# Patient Record
Sex: MALE
[De-identification: names, ages, dates, MRNs, and addresses within clinical notes are randomized per-mention and may not be internally consistent; named-entity substitution may affect disease eponyms.]

---

## 2018-05-12 ENCOUNTER — HOSPITAL ENCOUNTER (INPATIENT)
Dept: HOSPITAL 92 - ERS | Age: 63
LOS: 4 days | Discharge: HOME | DRG: 291 | End: 2018-05-16
Attending: FAMILY MEDICINE | Admitting: FAMILY MEDICINE
Payer: SELF-PAY

## 2018-05-12 ENCOUNTER — HOSPITAL ENCOUNTER (EMERGENCY)
Dept: HOSPITAL 18 - NAV ERS | Age: 63
Discharge: HOME | End: 2018-05-12
Payer: SELF-PAY

## 2018-05-12 VITALS — BODY MASS INDEX: 29 KG/M2

## 2018-05-12 DIAGNOSIS — N17.9: ICD-10-CM

## 2018-05-12 DIAGNOSIS — D63.1: ICD-10-CM

## 2018-05-12 DIAGNOSIS — I50.9: ICD-10-CM

## 2018-05-12 DIAGNOSIS — N18.9: ICD-10-CM

## 2018-05-12 DIAGNOSIS — I08.1: ICD-10-CM

## 2018-05-12 DIAGNOSIS — N18.6: ICD-10-CM

## 2018-05-12 DIAGNOSIS — Z79.899: ICD-10-CM

## 2018-05-12 DIAGNOSIS — E87.5: ICD-10-CM

## 2018-05-12 DIAGNOSIS — K74.60: ICD-10-CM

## 2018-05-12 DIAGNOSIS — E87.70: ICD-10-CM

## 2018-05-12 DIAGNOSIS — E03.9: ICD-10-CM

## 2018-05-12 DIAGNOSIS — R74.8: ICD-10-CM

## 2018-05-12 DIAGNOSIS — K70.30: ICD-10-CM

## 2018-05-12 DIAGNOSIS — E87.70: Primary | ICD-10-CM

## 2018-05-12 DIAGNOSIS — E87.2: ICD-10-CM

## 2018-05-12 DIAGNOSIS — F10.21: ICD-10-CM

## 2018-05-12 DIAGNOSIS — Z99.2: ICD-10-CM

## 2018-05-12 DIAGNOSIS — E11.22: ICD-10-CM

## 2018-05-12 DIAGNOSIS — Z79.4: ICD-10-CM

## 2018-05-12 DIAGNOSIS — I13.2: Primary | ICD-10-CM

## 2018-05-12 DIAGNOSIS — I13.0: ICD-10-CM

## 2018-05-12 DIAGNOSIS — E11.9: ICD-10-CM

## 2018-05-12 DIAGNOSIS — R79.89: ICD-10-CM

## 2018-05-12 LAB
ALBUMIN SERPL BCG-MCNC: 3.4 G/DL (ref 3.4–4.8)
ALBUMIN SERPL BCG-MCNC: 3.5 G/DL (ref 3.4–4.8)
ALP SERPL-CCNC: 190 U/L (ref 40–150)
ALP SERPL-CCNC: 204 U/L (ref 40–150)
ALT SERPL W P-5'-P-CCNC: 43 U/L (ref 8–55)
ALT SERPL W P-5'-P-CCNC: 50 U/L (ref 8–55)
ANION GAP SERPL CALC-SCNC: 17 MMOL/L (ref 10–20)
ANION GAP SERPL CALC-SCNC: 18 MMOL/L (ref 10–20)
ANION GAP SERPL CALC-SCNC: 22 MMOL/L (ref 10–20)
APTT PPP: 35.2 SEC (ref 22.9–36.1)
AST SERPL-CCNC: 51 U/L (ref 5–34)
AST SERPL-CCNC: 62 U/L (ref 5–34)
BASOPHILS # BLD AUTO: 0 THOU/UL (ref 0–0.2)
BASOPHILS # BLD AUTO: 0.1 THOU/UL (ref 0–0.2)
BASOPHILS NFR BLD AUTO: 0.2 % (ref 0–1)
BASOPHILS NFR BLD AUTO: 0.8 % (ref 0–1)
BILIRUB SERPL-MCNC: 1.1 MG/DL (ref 0.2–1.2)
BILIRUB SERPL-MCNC: 1.1 MG/DL (ref 0.2–1.2)
BUN SERPL-MCNC: 114 MG/DL (ref 8.4–25.7)
BUN SERPL-MCNC: 115 MG/DL (ref 8.4–25.7)
BUN SERPL-MCNC: 76 MG/DL (ref 8.4–25.7)
CALCIUM SERPL-MCNC: 9 MG/DL (ref 7.8–10.44)
CALCIUM SERPL-MCNC: 9 MG/DL (ref 7.8–10.44)
CALCIUM SERPL-MCNC: 9.4 MG/DL (ref 7.8–10.44)
CHLORIDE SERPL-SCNC: 103 MMOL/L (ref 98–107)
CHLORIDE SERPL-SCNC: 107 MMOL/L (ref 98–107)
CHLORIDE SERPL-SCNC: 107 MMOL/L (ref 98–107)
CK MB SERPL-MCNC: 3.7 NG/ML (ref 0–6.6)
CK MB SERPL-MCNC: 4.6 NG/ML (ref 0–6.6)
CK SERPL-CCNC: 112 U/L (ref 30–200)
CK SERPL-CCNC: 121 U/L (ref 30–200)
CO2 SERPL-SCNC: 16 MMOL/L (ref 23–31)
CO2 SERPL-SCNC: 17 MMOL/L (ref 23–31)
CO2 SERPL-SCNC: 22 MMOL/L (ref 23–31)
CREAT CL PREDICTED SERPL C-G-VRATE: 0 ML/MIN (ref 70–130)
CREAT CL PREDICTED SERPL C-G-VRATE: 0 ML/MIN (ref 70–130)
CREAT CL PREDICTED SERPL C-G-VRATE: 19 ML/MIN (ref 70–130)
EOSINOPHIL # BLD AUTO: 0 THOU/UL (ref 0–0.7)
EOSINOPHIL # BLD AUTO: 0.1 THOU/UL (ref 0–0.7)
EOSINOPHIL NFR BLD AUTO: 0.2 % (ref 0–10)
EOSINOPHIL NFR BLD AUTO: 0.9 % (ref 0–10)
GLOBULIN SER CALC-MCNC: 3.7 G/DL (ref 2.4–3.5)
GLOBULIN SER CALC-MCNC: 3.7 G/DL (ref 2.4–3.5)
GLUCOSE SERPL-MCNC: 150 MG/DL (ref 80–115)
GLUCOSE SERPL-MCNC: 283 MG/DL (ref 80–115)
GLUCOSE SERPL-MCNC: 287 MG/DL (ref 80–115)
HBSAG INDEX: 0.21 S/CO (ref 0–0.99)
HGB BLD-MCNC: 10.3 G/DL (ref 14–18)
HGB BLD-MCNC: 10.5 G/DL (ref 14–18)
INR PPP: 1.1
IRON SERPL-MCNC: 18 UG/DL (ref 65–175)
LIPASE SERPL-CCNC: 62 U/L (ref 8–78)
LYMPHOCYTES # BLD AUTO: 1.4 THOU/UL (ref 1.2–3.4)
LYMPHOCYTES # BLD: 0.8 THOU/UL (ref 1.2–3.4)
LYMPHOCYTES NFR BLD AUTO: 14.5 % (ref 21–51)
LYMPHOCYTES NFR BLD AUTO: 6.8 % (ref 21–51)
MAGNESIUM SERPL-MCNC: 2.5 MG/DL (ref 1.6–2.6)
MCH RBC QN AUTO: 30.5 PG (ref 27–31)
MCH RBC QN AUTO: 32.2 PG (ref 27–31)
MCV RBC AUTO: 94.9 FL (ref 80–94)
MCV RBC AUTO: 97 FL (ref 80–94)
MONOCYTES # BLD AUTO: 0.7 THOU/UL (ref 0.11–0.59)
MONOCYTES # BLD AUTO: 0.8 THOU/UL (ref 0.11–0.59)
MONOCYTES NFR BLD AUTO: 5.9 % (ref 0–10)
MONOCYTES NFR BLD AUTO: 7.9 % (ref 0–10)
NEUTROPHILS # BLD AUTO: 7.5 THOU/UL (ref 1.4–6.5)
NEUTROPHILS # BLD AUTO: 9.8 THOU/UL (ref 1.4–6.5)
NEUTROPHILS NFR BLD AUTO: 75.9 % (ref 42–75)
NEUTROPHILS NFR BLD AUTO: 86.9 % (ref 42–75)
PLATELET # BLD AUTO: 216 THOU/UL (ref 130–400)
PLATELET # BLD AUTO: 218 THOU/UL (ref 130–400)
POTASSIUM SERPL-SCNC: 4.2 MMOL/L (ref 3.5–5.1)
POTASSIUM SERPL-SCNC: 5.7 MMOL/L (ref 3.5–5.1)
POTASSIUM SERPL-SCNC: 5.9 MMOL/L (ref 3.5–5.1)
PROT UR STRIP.AUTO-MCNC: (no result) MG/DL
PROTHROMBIN TIME: 14.8 SEC (ref 12–14.7)
RBC # BLD AUTO: 3.26 MILL/UL (ref 4.7–6.1)
RBC # BLD AUTO: 3.39 MILL/UL (ref 4.7–6.1)
SODIUM SERPL-SCNC: 136 MMOL/L (ref 136–145)
SODIUM SERPL-SCNC: 138 MMOL/L (ref 136–145)
SODIUM SERPL-SCNC: 139 MMOL/L (ref 136–145)
SP GR UR STRIP: 1.02 (ref 1–1.03)
TROPONIN I SERPL DL<=0.01 NG/ML-MCNC: 0.19 NG/ML (ref ?–0.03)
TROPONIN I SERPL DL<=0.01 NG/ML-MCNC: 0.21 NG/ML (ref ?–0.03)
TROPONIN I SERPL DL<=0.01 NG/ML-MCNC: 0.24 NG/ML (ref ?–0.03)
TROPONIN I SERPL DL<=0.01 NG/ML-MCNC: 0.25 NG/ML (ref ?–0.03)
TROPONIN I SERPL DL<=0.01 NG/ML-MCNC: 0.26 NG/ML (ref ?–0.03)
TROPONIN I SERPL DL<=0.01 NG/ML-MCNC: 0.26 NG/ML (ref ?–0.03)
UIBC SERPL-MCNC: 183 MCG/DL (ref 261–462)
VIT B12 SERPL-MCNC: 1265 PG/ML (ref 211–911)
WBC # BLD AUTO: 11.3 THOU/UL (ref 4.8–10.8)
WBC # BLD AUTO: 9.8 THOU/UL (ref 4.8–10.8)

## 2018-05-12 PROCEDURE — 82553 CREATINE MB FRACTION: CPT

## 2018-05-12 PROCEDURE — 87324 CLOSTRIDIUM AG IA: CPT

## 2018-05-12 PROCEDURE — 87449 NOS EACH ORGANISM AG IA: CPT

## 2018-05-12 PROCEDURE — 81015 MICROSCOPIC EXAM OF URINE: CPT

## 2018-05-12 PROCEDURE — 83036 HEMOGLOBIN GLYCOSYLATED A1C: CPT

## 2018-05-12 PROCEDURE — 82746 ASSAY OF FOLIC ACID SERUM: CPT

## 2018-05-12 PROCEDURE — 82550 ASSAY OF CK (CPK): CPT

## 2018-05-12 PROCEDURE — G0009 ADMIN PNEUMOCOCCAL VACCINE: HCPCS

## 2018-05-12 PROCEDURE — 93306 TTE W/DOPPLER COMPLETE: CPT

## 2018-05-12 PROCEDURE — 81003 URINALYSIS AUTO W/O SCOPE: CPT

## 2018-05-12 PROCEDURE — 83630 LACTOFERRIN FECAL (QUAL): CPT

## 2018-05-12 PROCEDURE — 93005 ELECTROCARDIOGRAM TRACING: CPT

## 2018-05-12 PROCEDURE — 83735 ASSAY OF MAGNESIUM: CPT

## 2018-05-12 PROCEDURE — 85610 PROTHROMBIN TIME: CPT

## 2018-05-12 PROCEDURE — 83605 ASSAY OF LACTIC ACID: CPT

## 2018-05-12 PROCEDURE — 83690 ASSAY OF LIPASE: CPT

## 2018-05-12 PROCEDURE — 80048 BASIC METABOLIC PNL TOTAL CA: CPT

## 2018-05-12 PROCEDURE — 36415 COLL VENOUS BLD VENIPUNCTURE: CPT

## 2018-05-12 PROCEDURE — 90935 HEMODIALYSIS ONE EVALUATION: CPT

## 2018-05-12 PROCEDURE — 82140 ASSAY OF AMMONIA: CPT

## 2018-05-12 PROCEDURE — 80061 LIPID PANEL: CPT

## 2018-05-12 PROCEDURE — 84484 ASSAY OF TROPONIN QUANT: CPT

## 2018-05-12 PROCEDURE — 96375 TX/PRO/DX INJ NEW DRUG ADDON: CPT

## 2018-05-12 PROCEDURE — 85025 COMPLETE CBC W/AUTO DIFF WBC: CPT

## 2018-05-12 PROCEDURE — 84443 ASSAY THYROID STIM HORMONE: CPT

## 2018-05-12 PROCEDURE — 82607 VITAMIN B-12: CPT

## 2018-05-12 PROCEDURE — 80053 COMPREHEN METABOLIC PANEL: CPT

## 2018-05-12 PROCEDURE — 71046 X-RAY EXAM CHEST 2 VIEWS: CPT

## 2018-05-12 PROCEDURE — 82728 ASSAY OF FERRITIN: CPT

## 2018-05-12 PROCEDURE — 84145 PROCALCITONIN (PCT): CPT

## 2018-05-12 PROCEDURE — 87340 HEPATITIS B SURFACE AG IA: CPT

## 2018-05-12 PROCEDURE — 83550 IRON BINDING TEST: CPT

## 2018-05-12 PROCEDURE — G0257 UNSCHED DIALYSIS ESRD PT HOS: HCPCS

## 2018-05-12 PROCEDURE — 83880 ASSAY OF NATRIURETIC PEPTIDE: CPT

## 2018-05-12 PROCEDURE — 90471 IMMUNIZATION ADMIN: CPT

## 2018-05-12 PROCEDURE — 84100 ASSAY OF PHOSPHORUS: CPT

## 2018-05-12 PROCEDURE — 90732 PPSV23 VACC 2 YRS+ SUBQ/IM: CPT

## 2018-05-12 PROCEDURE — 85730 THROMBOPLASTIN TIME PARTIAL: CPT

## 2018-05-12 PROCEDURE — 86580 TB INTRADERMAL TEST: CPT

## 2018-05-12 PROCEDURE — 87040 BLOOD CULTURE FOR BACTERIA: CPT

## 2018-05-12 PROCEDURE — 83540 ASSAY OF IRON: CPT

## 2018-05-12 PROCEDURE — 36416 COLLJ CAPILLARY BLOOD SPEC: CPT

## 2018-05-12 PROCEDURE — 96374 THER/PROPH/DIAG INJ IV PUSH: CPT

## 2018-05-12 RX ADMIN — INSULIN GLARGINE SCH MLS: 100 INJECTION, SOLUTION SUBCUTANEOUS at 21:28

## 2018-05-12 RX ADMIN — INSULIN LISPRO PRN UNITS: 100 INJECTION, SOLUTION INTRAVENOUS; SUBCUTANEOUS at 18:20

## 2018-05-12 RX ADMIN — HEPARIN SODIUM SCH UNITS: 5000 INJECTION, SOLUTION INTRAVENOUS; SUBCUTANEOUS at 21:28

## 2018-05-12 RX ADMIN — HEPARIN SODIUM SCH UNITS: 5000 INJECTION, SOLUTION INTRAVENOUS; SUBCUTANEOUS at 18:20

## 2018-05-12 NOTE — CON
DATE OF CONSULTATION:  05/12/2018

 

REASON FOR CONSULTATION:  Hyperkalemia.

 

HISTORY OF PRESENT ILLNESS:  This is a 63-year-old gentleman with known ESRD, who presented to the [facility] for evaluation and management of dialysis.  The patient denies headache, numbness, tingling or
 weakness.  Denies any nausea, vomiting or chest pain.

 

PAST MEDICAL HISTORY:  Significant for end-stage renal disease, hypertension, anemia, diabetes mellit
us, history of AV fistula, and history of diabetic gastroparesis.

 

SOCIAL ECONOMIC HISTORY:  No alcohol or drug use.

 

FAMILY HISTORY:  Negative for ESRD.

 

ALLERGIES:  Reviewed.

 

HOME MEDICATIONS:  List reviewed.

 

REVIEW OF SYSTEMS:  Fifteen-point review of systems was performed and negative except for positive no
dianna above.

GENERAL:  Weakness-

HEAD:  Headache-

NECK:  No swelling or lumps. 

NOSE:  No epistaxis or discharge.

EYES:  No diplopia or pain.

RESPIRATORY:  Dyspnea-

CARDIOVASCULAR:  Chest pain-

GASTROINTESTINAL:  Nausea-

/GYN:  Hematuria-

MUSCULOSKELETAL:  No joint pain.

NEUROPSYCHIATIC SYSTEMS:  No suicidal ideation.  No ideation.

SKIN:  Denies any rash or ulcer.

CONSTITUTIONAL:   No fever or chills.

 

PHYSICAL EXAMINATION:

GENERAL:  Patient is awake and alert.

VITAL SIGNS:  Afebrile, pulse 60, breathing 16, and blood pressure 187/88.

GENERAL APPEARANCE AND MENTAL STATUS:  Fair.

HEAD/NECK:  Normocephalic.  Atraumatic.

EYES:  EOMI.  No deformity.

EARS:  Clear.  No ulcers.

NOSE:   Intact.  No lesions.

MOUTH:  Clear.  No discharge.

THROAT:  Clear.  No exudate.

LUNGS:   Clear.  No crackles.

CARDIAC:  S1, S2.  No rub.

ABDOMEN:  Benign.  BS+.

GENITALIA/RECTUM:  Bah absent.

BACK/EXTREMITIES:  Edema 0+ Ulcer-

NEUROLOGICAL:  Alert and motor intact. 

SKIN:  Rash- Bruise-

 

LABORATORY DATA:  Hemoglobin 10.5.

 

ASSESSMENT AND RECOMMENDATIONS:

1.  Stage 6 chronic kidney disease.  I will plan dialysis.

2.  Hyperkalemia.  Plan dialysis.

3.  Metabolic acidosis.  Plan dialysis.

4.  Congestive heart failure.  Plan dialysis.

 

Overall, prognosis is poor.   consult has been ordered.

## 2018-05-12 NOTE — PDOC.FPRHP
- History of Present Illness


Chief Complaint: Shortness of breath


History of Present Illness: 





Mr. Terry presents as a transfer of care from [facility] for chief complaint of 

shortness of breath for the last few days. He reports that he has been getting 

progressively more short of breath for the last few days but it got unbearable 

at about 0500 this morning. He endorses associated chest tightness when he 

coughs but not at rest. The discomfort does not radiate and he denies any 

diaphoresis. He does endorses some nausea associated with trying to cough up 

phlegm. His cough is productive of clear sputum. He denies any hemoptysis, fever

, chills, sick contacts. He arrived here from [place] 8 days ago. He had an AV 

fistula vs. shunt placed 2 months ago in [place] in anticipation of possible 

need for dialysis. He is very fearful of dialysis as he had a brother pass away 

immediately after starting dialysis. He reports that the nitro paste helped 

decrease his need to cough.








ED Course: 





ASA 81mg x4, nitro paste applied, 100 mg IV lasix, sodium bicarb, calcium 

chloride, zofran, kayexelate given





- Allergies/Adverse Reactions


 Allergies











Allergy/AdvReac Type Severity Reaction Status Date / Time


 


No Known Allergies Allergy   Unverified 05/12/18 10:32














- Home Medications


 











 Medication  Instructions  Recorded  Confirmed  Type


 


Bumetanide [Bumex] 1 mg PO DAILY 05/12/18 05/12/18 History


 


Folic Acid 800 mcg DAILY 05/12/18 05/12/18 History


 


Iron,Carb/Vit C/Vit B12/Folic 1 tab DAILY 05/12/18 05/12/18 History





[Iron 100 Plus]    


 


Lactulose 10 GM/15ML Oral Sol 15 gm PO DAILY 05/12/18 05/12/18 History





[Lactulose]    


 


Levothyroxine Sodium 100 mcg PO QAM 05/12/18 05/12/18 History


 


Multivitamin [Multivitamins] 1 tab DAILY 05/12/18 05/12/18 History


 


NPH, Human Insulin Isophane 20 unit SC QAM 05/12/18 05/12/18 History





[Humulin N]    











Comments: 


Non-formulary: Telmesartan 40mg qDay, Paracetamol 1g qDay, Comprimidos 1mg qDay

, Cisaprida 5mg qDay





- History


PMHx: Insulin-dependent DM, HTN, hypothyroidism, CKD


 


PSHx: Shunt vs. fistula placement (2018)





FHx: Children with DM, does not know parents medical history


 


Social: Used to socially drink (2-3x/mo) quit 1 year ago, denies tobacco use, 

drug use, tattoos. Former occupation .


 








- Review of Systems


General: reports: weight/appetite/sleep changes, fatigue.  denies: fever/chills

, night sweats


Eyes: denies: eye pain, vision changes


ENT: denies: nasal congestion, rhinorrhea


Respiratory: reports: cough, congestion, shortness of breath, exercise 

intolerance


Cardiovascular: reports: paroxysmal nocturnal dyspnea, orthopnea.  denies: 

chest pain, palpitation


Gastrointestinal: reports: nausea.  denies: vomiting, diarrhea, constipation, 

abdominal pain, GI bleeding


Genitourinary: denies: incontinence, dysuria, polyuria, discharge


Skin: denies: rashes, lesions, itching


Musculoskeletal: reports: arthritis/arthralgias.  denies: pain, tenderness


Neurological: reports: weakness.  denies: numbness, syncope, seizure


Psychological: denies: anxiety, depression





- Vital signs


BP: 156/89  HR: 85 RR: 22 Tmax: 98.5 Pox: 94% on RA  Wt: 73kg   








- Physical Exam


Constitutional: NAD, awake, alert and oriented


HEENT: normocephalic and atraumatic, PERRLA, EOMI, conjunctiva clear (slight 

icterus), grossly normal vision, TM's clear and intact, grossly normal hearing, 

normal nasal mucosa, MMM, oropharynx clear


Neck: supple, trachea midline


Chest: no-tender to palpation, no lesions


Heart: RRR, normal S1/S2, no murmurs/rubs/gallops, pulses present, no edema


Lungs: no respiratory distress, good air movement, other (faint crackles at BL 

bases)


Abdomen: soft, non-tender, bowel sounds present


Musculoskeletal: normal structure, normal tone


-Musculoskeletal: 





palpable thrill located just proximal to R elbow


Neurological: no focal deficit, normal sensation, DTRs 2+


Skin: no rash/lesions, good turgor, capillary refill <2 seconds


Heme/Lymphatic: no purpura, no petechia


Psychiatric: normal mood and affect, good judgment and insight, intact recent 

and remote memory





FMR H&P: Results





- Labs


Result Diagrams: 


 05/12/18 08:14





 05/12/18 14:01


Lab results: 


 











WBC  11.3 thou/uL (4.8-10.8)  H  05/12/18  08:14    


 


Hgb  10.5 g/dL (14.0-18.0)  L  05/12/18  08:14    


 


Hct  31.6 % (42.0-52.0)  L  05/12/18  08:14    


 


MCV  97.0 fl (80.0-94.0)  H  05/12/18  08:14    


 


Plt Count  218 thou/uL (130-400)   05/12/18  08:14    


 


Neutrophils %  86.9 % (42.0-75.0)  H  05/12/18  08:14    


 


Sodium  136 mmol/L (136-145)   05/12/18  08:14    


 


Potassium  5.9 mmol/L (3.5-5.1)  H  05/12/18  08:14    


 


Chloride  107 mmol/L ()   05/12/18  08:14    


 


Carbon Dioxide  17 mmol/L (23-31)  L  05/12/18  08:14    


 


BUN  114 mg/dL (8.4-25.7)  H  05/12/18  08:14    


 


Creatinine  6.70 mg/dL (0.6-1.3)  H  05/12/18  08:14    


 


Glucose  283 mg/dL ()  H  05/12/18  08:14    


 


Calcium  9.0 mg/dL (7.8-10.44)   05/12/18  08:14    


 


Total Bilirubin  1.1 mg/dL (0.2-1.2)   05/12/18  08:14    


 


AST  62 U/L (5-34)  H  05/12/18  08:14    


 


ALT  50 U/L (8-55)   05/12/18  08:14    


 


Alkaline Phosphatase  204 U/L ()  H  05/12/18  08:14    


 


Creatine Kinase  121 U/L ()   05/12/18  08:14    


 


CK-MB (CK-2)  3.7 ng/mL (0-6.6)   05/12/18  08:14    


 


B-Natriuretic Peptide  3649.4 pg/mL (0-100)  H  05/12/18  08:14    


 


Serum Total Protein  7.1 g/dL (5.8-8.1)   05/12/18  08:14    


 


Albumin  3.4 g/dL (3.4-4.8)   05/12/18  08:14    


 


Lipase  62 U/L (8-78)   05/12/18  08:14    














- EKG Interpretation


EKG: 





NSR, rate 86 bpm, L axis deviation, no ST elevation or depression





- Radiology Interpretation


  ** Chest x-ray


Status: image reviewed by me (Enlarged cardiac silhouette, pulmonary vessels 

prominent)





FMR H&P: A/P





- Problem List


(1) Chronic kidney disease (CKD)


Current Visit: Yes   Status: Acute   Code(s): N18.9 - CHRONIC KIDNEY DISEASE, 

UNSPECIFIED   





(2) Renal failure (ARF), acute on chronic


Current Visit: Yes   Status: Acute   Code(s): N17.9 - ACUTE KIDNEY FAILURE, 

UNSPECIFIED; N18.9 - CHRONIC KIDNEY DISEASE, UNSPECIFIED   





(3) Elevated brain natriuretic peptide (BNP) level


Current Visit: Yes   Status: Acute   Code(s): R79.89 - OTHER SPECIFIED ABNORMAL 

FINDINGS OF BLOOD CHEMISTRY   





(4) Insulin dependent diabetes mellitus


Current Visit: Yes   Status: Acute   Code(s): E11.9 - TYPE 2 DIABETES MELLITUS 

WITHOUT COMPLICATIONS; Z79.4 - LONG TERM (CURRENT) USE OF INSULIN   





(5) Hypertension


Current Visit: Yes   Status: Acute   Code(s): I10 - ESSENTIAL (PRIMARY) 

HYPERTENSION   





(6) Hypothyroidism


Current Visit: Yes   Status: Acute   Code(s): E03.9 - HYPOTHYROIDISM, 

UNSPECIFIED   





(7) Fluid overload


Current Visit: Yes   Status: Acute   Code(s): E87.70 - FLUID OVERLOAD, 

UNSPECIFIED   





(8) Hyperkalemia


Current Visit: Yes   Status: Acute   Code(s): E87.5 - HYPERKALEMIA   





(9) Elevated troponin


Current Visit: Yes   Status: Acute   Code(s): R74.8 - ABNORMAL LEVELS OF OTHER 

SERUM ENZYMES   





- Plan


62 yo M with known PMHx of CKD, HTN, IDDM here with fluid overload secondary to 

likely acute on chronic CKD in addition to possible CHF





1. Fluid overload


- CXR shows pulmonary vessel prominence along with elevated BNP and symptoms


- Respiratory status stable at this time


- Appreciate Dr. Garnica's assistance with evaluation for possible dialysis


- Lasix given in ED


- Strict I/Os


- Echo ordered





2. Elevated troponin


- Suspect demand ischemia


- No ST changes on EKG


- ASA 81mgx4 and nitro paste given in ED


- Asyptomatic at this time


- Will trend troponins and repeat EKG if chest pain arises


- Monitor on telemetry


- Will check FLP, a1c, Mg, Phos


- Depending on ASCVD risk, may start daily ASA/statin





3. Hyperkalemia


- Lasix and kayexelate given in ED


- Dr. Garnica notified


- Will recheck in 4-6 hours pending Dr. Garnica's recommendations


- Possible dialysis


- No peaked T waves on EKG





4. Acute renal failure on CKD


- Apprecaite Dr. Garnica's recommendations


- Fistula vs. graft in place in RUE





5. Insulin dependent DM


- Will check A1c


- ACHS accuchecks


- Resume home insulin + moderate SSI





6. HTN


- Will resume home meds as able (many not on forumlary here)





7. Hypothyroidism


- TSH WNL


- Continue home levothyroxine





PPX: Heparin 5000u TID





Attending Addendum





- Attending Addendum


Date/Time: 05/12/18 1130





I personally evaluated the patient and discussed the management with Dr. Limon.


I agree with the History, Examination, Assessment and Plan documented above 

with any addition or exceptions noted below.





Patient with history of DM, HTN, and CKD4 presenting with increasing shortness 

of breath, MORALES, and orthopnea associated with worsening renal function. Patient 

has been followed by nephrology in [place] with anticipation of HD. However, he 

recently arrived in the [place] about 1 week ago. Over the last few days has had 

increasing symptoms that acutely worsened early this morning. Patient is not 

currently in any respiratory distress and sats are normal on room air after 

receiving NItro and Lasix in the ED. Labs are consistent with end stage renal 

failure and volume overload with likely demand ischemia. CXR is also consistent 

with that diagnosis. Patient is mildly hyperkalemic but no evidence of cardiac 

toxicity. Patient will be admitted to telemetry, trend troponins, supplemental 

O2 as needed, and continue diuresis as able with strict I/O. Dr. Garnica has been 

consulted and I suspect patient may need to begin HD therapy due to his volume 

overload and now inability to regulate electrolytes. Most of his HTN meds are 

not available in the [place], and so we will begin him on standard therapy and 

titrate as needed. Anticipate 3+ days of hospitalization unless he obtains some 

functional recovery of his kidneys.

## 2018-05-12 NOTE — RAD
CHEST 2 VIEWS:

 

History Dyspnea.

 

COMPARISON: 

None.

 

FINDINGS: 

Enlarged cardiac silhouette.  The pulmonary vessels are prominent.  Pleural and parenchymal change of
 lung bases.  No pneumothorax.

 

IMPRESSION: 

Congestive heart failure.  Superimposed pneumonia cannot be excluded.  Continued surveillance is mike
mmended.

 

POS: DAWN

## 2018-05-13 LAB
ANION GAP SERPL CALC-SCNC: 14 MMOL/L (ref 10–20)
BUN SERPL-MCNC: 97 MG/DL (ref 8.4–25.7)
CALCIUM SERPL-MCNC: 8.8 MG/DL (ref 7.8–10.44)
CHD RISK SERPL-RTO: 3.9 (ref ?–4.5)
CHLORIDE SERPL-SCNC: 105 MMOL/L (ref 98–107)
CHOLEST SERPL-MCNC: 156 MG/DL
CO2 SERPL-SCNC: 22 MMOL/L (ref 23–31)
CREAT CL PREDICTED SERPL C-G-VRATE: 14 ML/MIN (ref 70–130)
GLUCOSE SERPL-MCNC: 139 MG/DL (ref 80–115)
HDLC SERPL-MCNC: 40 MG/DL
LDLC SERPL CALC-MCNC: 94 MG/DL
POTASSIUM SERPL-SCNC: 4.2 MMOL/L (ref 3.5–5.1)
SODIUM SERPL-SCNC: 137 MMOL/L (ref 136–145)
TRIGL SERPL-MCNC: 109 MG/DL (ref ?–150)

## 2018-05-13 RX ADMIN — THERA TABS SCH TAB: TAB at 09:05

## 2018-05-13 RX ADMIN — INSULIN GLARGINE SCH MLS: 100 INJECTION, SOLUTION SUBCUTANEOUS at 23:08

## 2018-05-13 RX ADMIN — INSULIN GLARGINE SCH MLS: 100 INJECTION, SOLUTION SUBCUTANEOUS at 22:04

## 2018-05-13 RX ADMIN — HEPARIN SODIUM SCH UNITS: 5000 INJECTION, SOLUTION INTRAVENOUS; SUBCUTANEOUS at 09:06

## 2018-05-13 RX ADMIN — INSULIN GLARGINE SCH MLS: 100 INJECTION, SOLUTION SUBCUTANEOUS at 09:05

## 2018-05-13 RX ADMIN — HEPARIN SODIUM SCH UNITS: 5000 INJECTION, SOLUTION INTRAVENOUS; SUBCUTANEOUS at 22:04

## 2018-05-13 RX ADMIN — HEPARIN SODIUM SCH UNITS: 5000 INJECTION, SOLUTION INTRAVENOUS; SUBCUTANEOUS at 15:10

## 2018-05-13 NOTE — PDOC.FM
- Subjective


Subjective: 





Patient received dialysis yesterday night.  He reports he is feeling better 

other than having a cough with sputum.  He denies fever/chills, no bloody sputum

, or CP.  No acute events overnight. 





- Objective


MAR Reviewed: Yes


Vital Signs & Weight: 


 Vital Signs (12 hours)











  Temp Pulse Resp BP Pulse Ox


 


 05/13/18 05:38      95


 


 05/12/18 20:00  98.6 F  78  18  141/74 H  95


 


 05/12/18 19:45  98.6 F  78  18   95








 Weight











Weight                         74.258 kg














Result Diagrams: 


 05/12/18 08:14





 05/13/18 05:36





<Vielka Giraldo - Last Filed: 05/13/18 10:26>





- Objective


Vital Signs & Weight: 


 Vital Signs (12 hours)











  Temp Pulse Resp BP Pulse Ox


 


 05/13/18 07:55  99.7 F H  80  16  141/69 H  94 L


 


 05/13/18 05:38      95


 


 05/13/18 04:00  99.6 F  79  18  139/72  94 L








 Weight











Weight                         70.5 kg














I&O: 


 











 05/12/18 05/13/18 05/14/18





 06:59 06:59 06:59


 


Intake Total  150 


 


Output Total  500 


 


Balance  -350 











Result Diagrams: 


 05/12/18 08:14





 05/13/18 05:36





<Florentino Inman - Last Filed: 05/13/18 11:06>





Phys Exam





- Physical Examination


Constitutional: NAD


Respiratory: no wheezing, no rales


splinting during deep respirations, mild rhonchi


Cardiovascular: RRR


Gastrointestinal: soft, non-tender


Musculoskeletal: no edema


Psychiatric: normal affect, A&O x 3





<Vielka Giraldo - Last Filed: 05/13/18 10:26>





Dx/Plan


(1) Chronic kidney disease (CKD)


Code(s): N18.9 - CHRONIC KIDNEY DISEASE, UNSPECIFIED   Status: Acute   





(2) Elevated brain natriuretic peptide (BNP) level


Code(s): R79.89 - OTHER SPECIFIED ABNORMAL FINDINGS OF BLOOD CHEMISTRY   Status

: Acute   





(3) Fluid overload


Code(s): E87.70 - FLUID OVERLOAD, UNSPECIFIED   Status: Acute   





(4) Hypertension


Code(s): I10 - ESSENTIAL (PRIMARY) HYPERTENSION   Status: Acute   





(5) Hypothyroidism


Code(s): E03.9 - HYPOTHYROIDISM, UNSPECIFIED   Status: Acute   





(6) Insulin dependent diabetes mellitus


Code(s): E11.9 - TYPE 2 DIABETES MELLITUS WITHOUT COMPLICATIONS; Z79.4 - LONG 

TERM (CURRENT) USE OF INSULIN   Status: Acute   





(7) Renal failure (ARF), acute on chronic


Code(s): N17.9 - ACUTE KIDNEY FAILURE, UNSPECIFIED; N18.9 - CHRONIC KIDNEY 

DISEASE, UNSPECIFIED   Status: Acute   





- Plan


Plan: 





Acute renal failure on CKD


- s/p dialysis yesterday


- Fistula vs. graft in place in RUE


- Dr. Garnica following, anticipate the need for dialysis either today or 

tomorrow. 





Cough with sputum production


- CXR shows possible PNA


- WBC mildly elevated, repeat tomorrrow


- afebrile.


- procalcitonin pending


- likely associated with fluid overload.





Fluid overload


- CXR shows pulmonary vessel prominence along with elevated BNP and symptoms


- s/p dialysis yesterday


- Strict I/Os


- Echo with EF 50-55% and elevated pulmonary artery pressure. 





Elevated troponin


- Suspect demand ischemia, troponins downtrended


- No ST changes on EKG


- ASA 81mgx4 and nitro paste given in ED


- ASCVD risk 20.1%, will start Lipitor 40





Hyperkalemia, resolved


- s/p Lasix and kayexelate given in ED and dialysis


- continue to monitor





Insulin dependent DM


- Will check A1c


- ACHS accuchecks


- Resume home insulin + moderate SSI





HTN


- well controlled on current meds. 


- continue to monitor





Hypothyroidism


- TSH WNL


- Continue home levothyroxine





PPX: Heparin 5000u TID





<Vielka Giraldo - Last Filed: 05/13/18 10:26>


(1) Chronic kidney disease (CKD)


Code(s): N18.9 - CHRONIC KIDNEY DISEASE, UNSPECIFIED   Status: Acute   





(2) Renal failure (ARF), acute on chronic


Code(s): N17.9 - ACUTE KIDNEY FAILURE, UNSPECIFIED; N18.9 - CHRONIC KIDNEY 

DISEASE, UNSPECIFIED   Status: Acute   





(3) Elevated brain natriuretic peptide (BNP) level


Code(s): R79.89 - OTHER SPECIFIED ABNORMAL FINDINGS OF BLOOD CHEMISTRY   Status

: Acute   





(4) Insulin dependent diabetes mellitus


Code(s): E11.9 - TYPE 2 DIABETES MELLITUS WITHOUT COMPLICATIONS; Z79.4 - LONG 

TERM (CURRENT) USE OF INSULIN   Status: Acute   





(5) Hypertension


Code(s): I10 - ESSENTIAL (PRIMARY) HYPERTENSION   Status: Acute   





(6) Hypothyroidism


Code(s): E03.9 - HYPOTHYROIDISM, UNSPECIFIED   Status: Acute   





(7) Fluid overload


Code(s): E87.70 - FLUID OVERLOAD, UNSPECIFIED   Status: Acute   





(8) Hyperkalemia


Code(s): E87.5 - HYPERKALEMIA   Status: Acute   





(9) Elevated troponin


Code(s): R74.8 - ABNORMAL LEVELS OF OTHER SERUM ENZYMES   Status: Acute   





<Florentino Inman - Last Filed: 05/13/18 11:06>





Attending Addendum





- Attending Addendum


Date/Time: 05/13/18 1104





I personally evaluated the patient and discussed the management with Dr. Giraldo.


I agree with the History, Examination, Assessment and Plan documented above 

with any addition or exceptions noted below.





Patient both subjectively and objectively improved this morning after 

initiating HD yesterday. Appreciate Nephro assistance and await further recs 

regarding continued HD therapy. Will need CM on board due to challenging social 

situation and the need for long term dialysis therapy. BP improved. Will obtain 

PPD due to persistent cough and immigrant status. Continue BP med titration as 

needed. 








<Florentino Inman - Last Filed: 05/13/18 11:06>

## 2018-05-13 NOTE — PRG
DATE OF SERVICE:  05/13/2018

 

SUBJECTIVE:  A 63-year-old gentleman being seen for end-stage renal disease.  
The patient denies any nausea, vomiting or chest pain.

 

PHYSICAL EXAMINATION:

GENERAL:  Patient is awake, alert.

VITAL SIGNS:  Afebrile, pulse 75, breathing 16, blood pressure 141/69.

GENERAL APPEARANCE AND MENTAL STATUS:  Fair.

HEAD/NECK:  Normocephalic.   Atraumatic.

EYES:  EOMI.  No deformity.

EARS:  Clear.  No ulcers.

NOSE:   Intact.  No lesions.

MOUTH:  Clear.  No discharge.

THROAT:  Clear.  No exudate.

LUNGS:   Clear.  No crackles.

CARDIAC:   S1, S2.  No rub.

ABDOMEN:   Benign.  BS+.

GENITALIA/RECTUM:  Bah absent.

BACK/EXTREMITIES:  Edema 0+ Ulcer-

NEUROLOGICAL:  Alert and motor intact.

SKIN:  Rash- Bruise-  

LYMPHATICS:  Edema- Ulcer-

 

LABORATORY DATA:  Potassium is 4.6.

 

ASSESSMENT AND RECOMMENDATIONS:

1.  Stage 6 chronic kidney disease, plan hemodialysis.

2.  Hypertension, stable.

3.  Anemia, stable.

4.  Medication based on glomerular filtration rate are appropriate.

 

MTDD

## 2018-05-14 LAB
ANION GAP SERPL CALC-SCNC: 16 MMOL/L (ref 10–20)
BASOPHILS # BLD AUTO: 0 THOU/UL (ref 0–0.2)
BASOPHILS NFR BLD AUTO: 0.2 % (ref 0–1)
BUN SERPL-MCNC: 116 MG/DL (ref 8.4–25.7)
CALCIUM SERPL-MCNC: 8.7 MG/DL (ref 7.8–10.44)
CHLORIDE SERPL-SCNC: 106 MMOL/L (ref 98–107)
CO2 SERPL-SCNC: 21 MMOL/L (ref 23–31)
CREAT CL PREDICTED SERPL C-G-VRATE: 12 ML/MIN (ref 70–130)
EOSINOPHIL # BLD AUTO: 0 THOU/UL (ref 0–0.7)
EOSINOPHIL NFR BLD AUTO: 0.2 % (ref 0–10)
GLUCOSE SERPL-MCNC: 75 MG/DL (ref 80–115)
HGB BLD-MCNC: 9.7 G/DL (ref 14–18)
LYMPHOCYTES # BLD: 1.1 THOU/UL (ref 1.2–3.4)
LYMPHOCYTES NFR BLD AUTO: 12.5 % (ref 21–51)
MCH RBC QN AUTO: 32.4 PG (ref 27–31)
MCV RBC AUTO: 98 FL (ref 80–94)
MONOCYTES # BLD AUTO: 0.8 THOU/UL (ref 0.11–0.59)
MONOCYTES NFR BLD AUTO: 9.3 % (ref 0–10)
NEUTROPHILS # BLD AUTO: 6.6 THOU/UL (ref 1.4–6.5)
NEUTROPHILS NFR BLD AUTO: 77.9 % (ref 42–75)
PLATELET # BLD AUTO: 222 THOU/UL (ref 130–400)
POTASSIUM SERPL-SCNC: 4.3 MMOL/L (ref 3.5–5.1)
RBC # BLD AUTO: 3 MILL/UL (ref 4.7–6.1)
SODIUM SERPL-SCNC: 139 MMOL/L (ref 136–145)
WBC # BLD AUTO: 8.5 THOU/UL (ref 4.8–10.8)

## 2018-05-14 RX ADMIN — INSULIN GLARGINE SCH MLS: 100 INJECTION, SOLUTION SUBCUTANEOUS at 12:29

## 2018-05-14 RX ADMIN — HEPARIN SODIUM SCH UNITS: 5000 INJECTION, SOLUTION INTRAVENOUS; SUBCUTANEOUS at 23:12

## 2018-05-14 RX ADMIN — HEPARIN SODIUM SCH UNITS: 5000 INJECTION, SOLUTION INTRAVENOUS; SUBCUTANEOUS at 15:29

## 2018-05-14 RX ADMIN — INSULIN GLARGINE SCH MLS: 100 INJECTION, SOLUTION SUBCUTANEOUS at 23:12

## 2018-05-14 RX ADMIN — HEPARIN SODIUM SCH: 5000 INJECTION, SOLUTION INTRAVENOUS; SUBCUTANEOUS at 12:16

## 2018-05-14 RX ADMIN — THERA TABS SCH TAB: TAB at 12:28

## 2018-05-14 NOTE — PRG
Patient Name:   KANDICE COX 

Date of service:  05/14/2018

 

Subjective:

Patient was seen and examined at bedside and overnight events noted. 

Patient denies any shortness of breath or chest pain or palpitation. 

No history of nausea or vomiting or diarrhea or fever or chills or cramps. 

 

Objective:

General: This is a well-built male in no apparent distress.

Vital signs:  Temperature 98.6, pulse 60, respirations 18, blood pressure 132/70.

HEENT:  Atraumatic, normocephalic.  Oral mucosa is moist.

Neck:  Supple.  

Cardiovascular:  S1 S2 heard.  Rate and rhythm regular.

Respiratory:  Clear to auscultation. 

Gastrointestinal:  Abdomen is soft.  

Musculoskeletal:  No tenderness.   No edema. 

Dermatologic:  No skin rash.

Neurologic:  Alert and awake and oriented X3.  No focal neurologic deficits.  Moving all the extremit
ies.

Psychiatric:  Mood and affect normal.

 

LABORATORY DATA:  Potassium 4.3, BUN is 160, and creatinine is 6.5.

 

ASSESSMENT AND PLAN:

1.  End-stage renal disease, started on hemodialysis.  Plan is to continue on dialysis as tolerated. 
 The patient was seen during dialysis, tolerating well.

2.  Edema, controlled.

3.  Hypertension, stable.

4.  Anemia.  Monitor hemoglobin.

 

Plan is to continue on dialysis as tolerated.  Follow with case management for outpatient placement.

## 2018-05-14 NOTE — PDOC.FM
- Subjective


Subjective: 





Bc Terry seen at bedside this morning with his daughter and wife.  He is 

doing well this morning.  He does not have an appetite and every time he has 

food in front of him, he becomes nauseated.  He has had no issues overnight and 

no complaints this morning.  Denies fever, chills, chest pain, dyspnea.  His 

oxygen saturation this morning is 95% on RA. 





- Objective


MAR Reviewed: Yes


Vital Signs & Weight: 


 Vital Signs (12 hours)











  Temp Pulse Resp BP Pulse Ox


 


 05/13/18 20:00  99.1 F  80  18  155/77 H  95








 Weight











Weight                         70.5 kg














I&O: 


 











 05/12/18 05/13/18 05/14/18





 06:59 06:59 06:59


 


Intake Total  150 


 


Output Total  500 


 


Balance  -350 











Result Diagrams: 


 05/12/18 08:14





 05/14/18 04:25





Phys Exam





- Physical Examination


Constitutional: NAD


HEENT: moist MMs, sclera anicteric


Neck: no JVD, supple, full ROM


Respiratory: no wheezing, no rales, no rhonchi, clear to auscultation bilateral


Cardiovascular: RRR, no significant murmur


Gastrointestinal: soft, non-tender, no distention


Musculoskeletal: no edema, pulses present


Neurological: non-focal, normal sensation, moves all 4 limbs


Psychiatric: normal affect, A&O x 3


Skin: no rash, normal turgor





Dx/Plan


(1) Renal failure (ARF), acute on chronic


Code(s): N17.9 - ACUTE KIDNEY FAILURE, UNSPECIFIED; N18.9 - CHRONIC KIDNEY 

DISEASE, UNSPECIFIED   Status: Acute   





(2) Chronic kidney disease (CKD)


Code(s): N18.9 - CHRONIC KIDNEY DISEASE, UNSPECIFIED   Status: Acute   





(3) Fluid overload


Code(s): E87.70 - FLUID OVERLOAD, UNSPECIFIED   Status: Acute   





(4) Hyperkalemia


Code(s): E87.5 - HYPERKALEMIA   Status: Acute   





(5) Hypertension


Code(s): I10 - ESSENTIAL (PRIMARY) HYPERTENSION   Status: Acute   





(6) Hypothyroidism


Code(s): E03.9 - HYPOTHYROIDISM, UNSPECIFIED   Status: Acute   





(7) Insulin dependent diabetes mellitus


Code(s): E11.9 - TYPE 2 DIABETES MELLITUS WITHOUT COMPLICATIONS; Z79.4 - LONG 

TERM (CURRENT) USE OF INSULIN   Status: Acute   





- Plan


Plan: 





(1) Acute renal failure on CKD


- s/p dialysis on 5/12


- Fistula vs. graft in place in RUE


- Dr. Garnica following, anticipate the need for dialysis either today or 

tomorrow. 





(2) Cough with sputum production


- CXR shows possible PNA


- WBC mildly elevated, repeat tomorrrow


- afebrile.


- procalcitonin is 1.43, concerning for bacterial infection vs retention of 

metabolites from ckd. 


- will likely start abx this morning





(3) Fluid overload


- CXR shows pulmonary vessel prominence along with elevated BNP and symptoms


- s/p dialysis yesterday


- Strict I/Os


- Echo with EF 50-55% and elevated pulmonary artery pressure. 





(4) Elevated troponin


- Suspect demand ischemia, troponins downtrended


- No ST changes on EKG


- ASA 81mgx4 and nitro paste given in ED


- ASCVD risk 20.1%, started lipitor 40 mg daily





(5) Hyperkalemia, resolved


- s/p Lasix and kayexelate given in ED and dialysis


- continue to monitor





(6) Insulin dependent DM


- hgA1c is 6.0


- ACHS accuchecks


- Resume home insulin + moderate SSI





(7) HTN


- well controlled on current meds. 


- continue to monitor





(8) Hypothyroidism


- TSH WNL


- Continue home levothyroxine

## 2018-05-14 NOTE — ADD-PRG
DATE OF SERVICE:  05/14/2018

 

This is an addendum to the note of Dr. Eleazar Sesay.  

 

Mr. Terry is currently in dialysis.  He is being followed by Dr. Garnica and will continue to receive d
ialysis.  He was admitted initially slightly fluid overloaded and hyperkalemic, but these have been r
esolved with his hemodialysis.

## 2018-05-15 LAB
ANION GAP SERPL CALC-SCNC: 17 MMOL/L (ref 10–20)
BASOPHILS # BLD AUTO: 0 THOU/UL (ref 0–0.2)
BASOPHILS NFR BLD AUTO: 0.1 % (ref 0–1)
BUN SERPL-MCNC: 78 MG/DL (ref 8.4–25.7)
CALCIUM SERPL-MCNC: 8.4 MG/DL (ref 7.8–10.44)
CHLORIDE SERPL-SCNC: 106 MMOL/L (ref 98–107)
CO2 SERPL-SCNC: 19 MMOL/L (ref 23–31)
CREAT CL PREDICTED SERPL C-G-VRATE: 14 ML/MIN (ref 70–130)
EOSINOPHIL # BLD AUTO: 0 THOU/UL (ref 0–0.7)
EOSINOPHIL NFR BLD AUTO: 0.3 % (ref 0–10)
GLUCOSE SERPL-MCNC: 76 MG/DL (ref 80–115)
HGB BLD-MCNC: 10.2 G/DL (ref 14–18)
LYMPHOCYTES # BLD: 1.2 THOU/UL (ref 1.2–3.4)
LYMPHOCYTES NFR BLD AUTO: 13.7 % (ref 21–51)
MCH RBC QN AUTO: 32.7 PG (ref 27–31)
MCV RBC AUTO: 99.2 FL (ref 80–94)
MONOCYTES # BLD AUTO: 0.9 THOU/UL (ref 0.11–0.59)
MONOCYTES NFR BLD AUTO: 10 % (ref 0–10)
NEUTROPHILS # BLD AUTO: 6.5 THOU/UL (ref 1.4–6.5)
NEUTROPHILS NFR BLD AUTO: 75.8 % (ref 42–75)
PLATELET # BLD AUTO: 208 THOU/UL (ref 130–400)
POTASSIUM SERPL-SCNC: 4.3 MMOL/L (ref 3.5–5.1)
RBC # BLD AUTO: 3.13 MILL/UL (ref 4.7–6.1)
SODIUM SERPL-SCNC: 138 MMOL/L (ref 136–145)
WBC # BLD AUTO: 8.6 THOU/UL (ref 4.8–10.8)

## 2018-05-15 RX ADMIN — THERA TABS SCH TAB: TAB at 09:47

## 2018-05-15 RX ADMIN — HEPARIN SODIUM SCH UNITS: 5000 INJECTION, SOLUTION INTRAVENOUS; SUBCUTANEOUS at 09:47

## 2018-05-15 RX ADMIN — INSULIN GLARGINE SCH: 100 INJECTION, SOLUTION SUBCUTANEOUS at 09:48

## 2018-05-15 RX ADMIN — INSULIN GLARGINE SCH MLS: 100 INJECTION, SOLUTION SUBCUTANEOUS at 21:01

## 2018-05-15 RX ADMIN — INSULIN LISPRO PRN UNITS: 100 INJECTION, SOLUTION INTRAVENOUS; SUBCUTANEOUS at 17:30

## 2018-05-15 RX ADMIN — HEPARIN SODIUM SCH UNITS: 5000 INJECTION, SOLUTION INTRAVENOUS; SUBCUTANEOUS at 21:01

## 2018-05-15 RX ADMIN — HEPARIN SODIUM SCH UNITS: 5000 INJECTION, SOLUTION INTRAVENOUS; SUBCUTANEOUS at 16:55

## 2018-05-15 NOTE — ADD-PRG
DATE OF SERVICE:  05/15/2018

 

ADDENDUM

 

Please add this has an addendum to the note of Dr. Eleazar Sesay.  Mr. Terry has returned from dialys
is this morning and in no distress.  The family relates after services are finished here, they are mo
ving him to [place] to continue dialysis there.  We will continue to follow with Dr. Garnica and appreci
ate his input.

## 2018-05-15 NOTE — PRG
DATE OF SERVICE:  05/15/2018

 

NEPHROLOGY PROGRESS NOTE

 

SUBJECTIVE:  Patient was seen and examined at bedside and overnight events noted.  Patient denies any
 shortness of breath or chest pain or palpitation.  No history of nausea or vomiting or diarrhea or f
ever or chills or cramps.

 

OBJECTIVE:

GENERAL:  This is a well-built  male in no apparent distress.

VITAL SIGNS:  Temperature 98.3, pulse 74, respiratory rate 18, blood pressure 152/73.

HEENT:  Atraumatic, normocephalic.  Oral mucosa is moist.

NECK:  Supple.

CARDIOVASCULAR:  S1, S2 heard.  Rate and rhythm regular.

RESPIRATORY:  Clear to auscultation.

GASTROINTESTINAL:  Abdomen is soft.

MUSCULOSKELETAL:  No tenderness.  No edema.

DERMATOLOGIC:  No skin rash.

NEUROLOGIC:  Alert and awake and oriented x3.  No focal neurologic deficits. Moving all the extremiti
es.

PSYCHIATRIC:  Mood and affect normal.

 

LABORATORY DATA:  Potassium is 4.3, BUN 78, creatinine is 5.5.

 

ASSESSMENT AND PLAN:

1.  End-stage renal disease.  We will continue on dialysis as tolerated.  No dialysis today.  We will
 plan for dialysis tomorrow.  We will have case management consult for outpatient placement.  Plan di
scussed with the family.

2.  Edema.

3.  Hypertension.

4.  Anemia.  We will monitor.  Hemoglobin is better.  Tolerating dialysis well so far.  Need outpatie
nt placement for dialysis.

## 2018-05-15 NOTE — PDOC.FM
- Subjective


Subjective: 





Bc Terry seen at bedside this morning.  He had dialysis yesterday and 

tolerated it well.  He did have a temperature elevated last night up to 100.5.  

He was asymptomatic and night team was not notified.  They were notified of 

patient complaining of diarrhea overnight.  He does take lactulose which is a 

scheduled home med.  





- Objective


MAR Reviewed: Yes


Vital Signs & Weight: 


 Vital Signs (12 hours)











  Temp Pulse Resp BP Pulse Ox


 


 05/15/18 04:00  98.9 F  64  20  142/74 H  94 L


 


 05/14/18 20:00  100.5 F H  66  18  139/66  93 L








 Weight











Weight                         70.5 kg














I&O: 


 











 05/14/18 05/15/18 05/16/18





 06:59 06:59 06:59


 


Intake Total 200 676 


 


Output Total  1300 


 


Balance 200 -624 











Result Diagrams: 


 05/15/18 04:46





 05/15/18 04:16





Phys Exam





- Physical Examination


Constitutional: NAD


HEENT: moist MMs, sclera anicteric


Neck: no JVD, supple, full ROM


Respiratory: no wheezing, no rales, no rhonchi, clear to auscultation bilateral


Cardiovascular: RRR, no significant murmur


Gastrointestinal: soft, non-tender, no distention


Musculoskeletal: no edema, pulses present


Neurological: non-focal, normal sensation, moves all 4 limbs


Psychiatric: normal affect, A&O x 3


Skin: no rash, normal turgor





Dx/Plan


(1) Renal failure (ARF), acute on chronic


Code(s): N17.9 - ACUTE KIDNEY FAILURE, UNSPECIFIED; N18.9 - CHRONIC KIDNEY 

DISEASE, UNSPECIFIED   Status: Acute   





(2) Chronic kidney disease (CKD)


Code(s): N18.9 - CHRONIC KIDNEY DISEASE, UNSPECIFIED   Status: Acute   





(3) Fluid overload


Code(s): E87.70 - FLUID OVERLOAD, UNSPECIFIED   Status: Acute   





(4) Hyperkalemia


Code(s): E87.5 - HYPERKALEMIA   Status: Acute   





(5) Hypertension


Code(s): I10 - ESSENTIAL (PRIMARY) HYPERTENSION   Status: Acute   





(6) Hypothyroidism


Code(s): E03.9 - HYPOTHYROIDISM, UNSPECIFIED   Status: Acute   





(7) Insulin dependent diabetes mellitus


Code(s): E11.9 - TYPE 2 DIABETES MELLITUS WITHOUT COMPLICATIONS; Z79.4 - LONG 

TERM (CURRENT) USE OF INSULIN   Status: Acute   





- Plan


Plan: 





(1) Acute renal failure on CKD


- s/p dialysis on 5/12 and 5/14


- Fistula vs. graft in place in RUE


- Dr. Garnica following, appreciate recs





(2) Cough with sputum production


- CXR shows possible PNA


- WBC is now normal 


- afebrile.


- procalcitonin is 1.43, concerning for bacterial infection vs retention of 

metabolites from ckd. 


- He did have a temp of 100.5 last night, will consider treatment


- checking UA, CXR, C diff Ag and Toxin and Fecal leukocytes





(3) Fluid overload


- CXR shows pulmonary vessel prominence along with elevated BNP and symptoms


- s/p dialysis yesterday


- Strict I/Os


- Echo with EF 50-55% and elevated pulmonary artery pressure. 





(4) Elevated troponin


- Suspect demand ischemia, troponins downtrended


- No ST changes on EKG


- ASA 81mgx4 and nitro paste given in ED


- ASCVD risk 20.1%, started lipitor 40 mg daily





(5) Hyperkalemia, resolved


- s/p Lasix and kayexelate given in ED and dialysis


- continue to monitor





(6) Insulin dependent DM


- hgA1c is 6.0


- ACHS accuchecks


- Resume home insulin + moderate SSI





(7) HTN


- well controlled on current meds. 


- continue to monitor





(8) Hypothyroidism


- TSH WNL


- Continue home levothyroxine

## 2018-05-15 NOTE — RAD
CHEST TWO VIEWS:

 

History: Fever. Pneumonia. 

 

Comparison: 5-12-18

 

FINDINGS: 

Cardiac silhouette is upper limits of normal in size. Pulmonary vasculature is slightly engorged. Pat
reena bibasilar infiltrates and small amount of right pleural fluid are unchanged. No lobar consolidati
on is apparent. 

 

IMPRESSION: 

Radiographic findings of CHF are stable. No lobar pneumonia is evident. 

 

POS: TPC

## 2018-05-16 VITALS — SYSTOLIC BLOOD PRESSURE: 134 MMHG | DIASTOLIC BLOOD PRESSURE: 65 MMHG | TEMPERATURE: 98.4 F

## 2018-05-16 LAB
ANION GAP SERPL CALC-SCNC: 17 MMOL/L (ref 10–20)
BASOPHILS # BLD AUTO: 0 THOU/UL (ref 0–0.2)
BASOPHILS NFR BLD AUTO: 0.1 % (ref 0–1)
BUN SERPL-MCNC: 94 MG/DL (ref 8.4–25.7)
CALCIUM SERPL-MCNC: 8.4 MG/DL (ref 7.8–10.44)
CASTS #/AREA URNS LPF: (no result) LPF
CHLORIDE SERPL-SCNC: 104 MMOL/L (ref 98–107)
CO2 SERPL-SCNC: 21 MMOL/L (ref 23–31)
CREAT CL PREDICTED SERPL C-G-VRATE: 11 ML/MIN (ref 70–130)
CRYSTAL-AUWI FLAG: 0.7 (ref 0–15)
CRYSTALS URNS QL MICRO: (no result) HPF
EOSINOPHIL # BLD AUTO: 0 THOU/UL (ref 0–0.7)
EOSINOPHIL NFR BLD AUTO: 0.4 % (ref 0–10)
GLUCOSE SERPL-MCNC: 106 MG/DL (ref 80–115)
HEV IGM SER QL: 16.4 (ref 0–7.99)
HGB BLD-MCNC: 10.1 G/DL (ref 14–18)
HYALINE CASTS #/AREA URNS LPF: (no result) LPF
LYMPHOCYTES # BLD: 1.1 THOU/UL (ref 1.2–3.4)
LYMPHOCYTES NFR BLD AUTO: 11.2 % (ref 21–51)
MCH RBC QN AUTO: 31.9 PG (ref 27–31)
MCV RBC AUTO: 97.4 FL (ref 80–94)
MONOCYTES # BLD AUTO: 0.8 THOU/UL (ref 0.11–0.59)
MONOCYTES NFR BLD AUTO: 8.2 % (ref 0–10)
NEUTROPHILS # BLD AUTO: 8.2 THOU/UL (ref 1.4–6.5)
NEUTROPHILS NFR BLD AUTO: 80.1 % (ref 42–75)
PATHC CAST-AUWI FLAG: 3.63 (ref 0–2.49)
PLATELET # BLD AUTO: 229 THOU/UL (ref 130–400)
POTASSIUM SERPL-SCNC: 4.4 MMOL/L (ref 3.5–5.1)
PROT UR STRIP.AUTO-MCNC: 300 MG/DL
RBC # BLD AUTO: 3.15 MILL/UL (ref 4.7–6.1)
RBC UR QL AUTO: (no result) HPF (ref 0–3)
SODIUM SERPL-SCNC: 138 MMOL/L (ref 136–145)
SP GR UR STRIP: 1.02 (ref 1–1.04)
SPERM-AUWI FLAG: 0 (ref 0–9.9)
WBC # BLD AUTO: 10.2 THOU/UL (ref 4.8–10.8)
YEAST-AUWI FLAG: 0 (ref 0–25)

## 2018-05-16 RX ADMIN — HEPARIN SODIUM SCH: 5000 INJECTION, SOLUTION INTRAVENOUS; SUBCUTANEOUS at 11:43

## 2018-05-16 RX ADMIN — INSULIN GLARGINE SCH MLS: 100 INJECTION, SOLUTION SUBCUTANEOUS at 12:05

## 2018-05-16 RX ADMIN — HEPARIN SODIUM SCH UNITS: 5000 INJECTION, SOLUTION INTRAVENOUS; SUBCUTANEOUS at 15:40

## 2018-05-16 RX ADMIN — THERA TABS SCH TAB: TAB at 11:46

## 2018-05-16 NOTE — PRG
DATE OF SERVICE:  05/16/2018

 

SUBJECTIVE:  Patient was seen and examined at bedside and overnight events noted. 

Patient denies any shortness of breath or chest pain or palpitation. 

No history of nausea or vomiting or diarrhea or fever or chills or cramps. 

 

OBJECTIVE:

GENERAL:  This is a well-built male in no apparent distress.

VITAL SIGNS:  Temperature 97.9, pulse 74, respiratory rate 18, blood pressure 107/75.

HEENT:  Atraumatic, normocephalic.  Oral mucosa is moist.

NECK:  Supple.

CARDIOVASCULAR:  S1 and S2 heard.  Rate and rhythm regular.

RESPIRATORY:  Clear to auscultation.

GASTROINTESTINAL:  Abdomen is soft.

MUSCULOSKELETAL:  No tenderness.  No edema.

DERMATOLOGIC:  No skin rash.

NEUROLOGIC:  Alert and awake and oriented x3.  No focal neurologic deficits.  Moving all the extremit
ies.

PSYCHIATRIC:  Mood and affect normal.

 

LABORATORY DATA:  Potassium is 4.4, BUN is 94, creatinine is 6.4.

 

ASSESSMENT AND PLAN:

1.  End-stage renal disease, on hemodialysis.  Plan is to continue on dialysis as tolerated.  Follow 
up with outpatient dialysis.

2.  Edema.

3.  Hypertension.

4.  Anemia.

 

The patient does not have funding to have outpatient dialysis.  The patient was advised to follow up 
with [facility].

## 2018-05-16 NOTE — DIS-2
DATE OF ADMISSION:  05/12/2018

 

DATE OF DISCHARGE:  05/16/2018

 

RESIDENT:  Eleazar Sesay MD

 

ADMITTING ATTENDING:  Dr. Florentino Inman.

 

DISCHARGE ATTENDING:  Dr. Jersey Henley.

 

CONSULTATIONS:

1.  Dr. Garnica, Nephrology on 05/12/2018.

2.  Case management on 05/12/2018.

3.  Walking program on 05/12/2018.

 

PROCEDURES:

1.  Echocardiogram on 05/12/2018.  Impression:  Ejection fraction is visually estimated at 50%-55%.  
Left atrium is moderately to severely dilated, mild mitral regurgitation present, mild to moderate tr
icuspid regurgitation present.

2.  Chest x-ray on 05/15/2018.  Impression:  Radiographic findings of CHF are stable.  No lobar pneum
onia is evident.

3.  Hemodialysis on 05/16/2018, 05/14/2018 and 05/12/2018.

4.  Blood culture taken on 05/15/2018, no growth to date.

 

PRIMARY DIAGNOSES:

1.  Acute on chronic renal failure.

2.  Fluid overload.

 

SECONDARY DIAGNOSES:

1.  Insulin-dependent diabetes mellitus.

2.  Hypertension.

3.  Hypothyroidism.

4.  Chronic kidney disease.

 

DISCHARGE MEDICATIONS:  Resume all home medications including;

1.  Bumex 1 mg p.o. daily.

2.  Levothyroxine 100 mcg p.o. q.a.m.

3.  Lactulose 15 grams p.o. daily.

4.  Multivitamin 1 tab daily.

5.  Iron 100 Plus 1 tablet daily.

6.  Folic acid 0.8 mg tablet, 800 mcg daily.

7.  NPH human insulin 20 units subcu q.a.m.

 

NEW HOME MEDICATIONS:

1.  Atorvastatin calcium 40 mg p.o. at bedtime.

2.  Metoprolol tartrate 25 mg p.o. b.i.d.

 

HISTORY OF PRESENT ILLNESS AND HOSPITAL COURSE:  Mr. Bc Terry is a 63-year-old male with past 
medical history of chronic kidney disease, hypertension, hyperlipidemia who presented to the ER, was 
transferred from [facility] for chief complaint of shortness of breath over the last few days.  It has
 been progressively worsening and became unbearable the morning of admission.  He endorsed chest tigh
tness and cough.  The discomfort does not radiate.  He denies any diaphoresis or vomiting.  His cough
 is productive with clear sputum.  He denies any hemoptysis, fever, chills, or sick contacts.  He arr
ived here from [place] 8 days ago.  He had an AV fistula versus shunt placed 2 months ago in [place] wi
 anticipation of possible need for dialysis.  He is fearful of dialysis as he had a brother who pas
sed away immediately after starting dialysis recently.  In the ED, he received 81 mg aspirin, nitro p
aste, 100 mg IV Lasix, sodium bicarbonate, calcium chloride, Zofran, and Kayexalate.  Initial labs we
re significant for white blood cell count of 11.3, hemoglobin 12.5, hematocrit 31.6.  BUN of 76, crea
tinine 4.21, bicarbonate of 22.  Sodium 138, potassium 4.2, chloride 103, glucose 150.  BNP of 3649. 
 EKG showed normal sinus rhythm and chest x-ray showed enlarged cardiac silhouette with prominent pul
monary vessels.  The patient was admitted for fluid overload and acute on chronic renal failure.  Dr. Garnica was consulted and dialysis was done first day of admission.  Echo was done and results are as a
siva.  Patient's symptoms resolved soon after first dialysis corrected hyperkalemia and metabolic aci
dosis as well as treated his symptoms.  Patient is non-funded, so case management was consulted for d
ischarge planning and dialysis planning for outpatient.  It was determined that the patient's best pl
an would be to go to [place] and see if there are any lobo dialysis beds available.  Dr. Garnica said
 the patient was cleared for discharge on 05/16/2018 after he had had 3 separate rounds of hemodialys
is with instructions to follow up with Nephrology in [place] to attempt to find a hemodialysis charit
y bed.  If any symptoms develop during that time to go to ER.  The patient and family were notified o
f instructions and they understood.  Patient was started on metoprolol and atorvastatin during this a
dmission and all other home meds were continued.

 

DISPOSITION:  Guarded.  Patient has family in [place] and states that there should be lobo beds av
ailable.  He should do well if he is able to get set up with lobo bed in [place].  Otherwise, he i
s aware of the symptoms and instructions to go to ER if he ever develops worsening symptoms.  He has 
family support and wife and daughter were with him throughout the entirety of his admission and will 
be with him after discharge to aid him.

 

DISCHARGE INSTRUCTIONS:

1.  Location:  Home.

2.  Diet:  Renal diet and heart healthy diet.

3.  Activity:  As tolerated.

4.  Follow up with [facility] dialysis bed in [place] and Nephrology in [place] as well as establishing 
a PCP to manage chronic conditions.

## 2018-05-16 NOTE — PDOC.FM
- Subjective


Subjective: 





Bc Terry seen at bedside this morning.  He did well overnight, there 

were no acute events.  He is scheduled for dialysis this morning.  He and 

family are aware that case management is on case, working to find lobo 

dialysis bed in [place].  Denies fever, chest pain, dyspnea, n/v. 





- Objective


MAR Reviewed: Yes


Vital Signs & Weight: 


 Vital Signs (12 hours)











  Temp Pulse Resp BP Pulse Ox


 


 05/16/18 04:00  99.0 F  62  18  133/64  96


 


 05/16/18 00:00  98.8 F  60  16  124/60  94 L








 Weight











Weight                         70.5 kg














I&O: 


 











 05/15/18 05/16/18 05/17/18





 06:59 06:59 06:59


 


Intake Total 676 840 


 


Output Total 1300 0 


 


Balance -624 840 











Result Diagrams: 


 05/16/18 04:35





 05/16/18 04:35


Radiology Reviewed by me: Yes (CXR: stable chf findings, no lobar pneumonia)





Phys Exam





- Physical Examination


Constitutional: NAD


HEENT: moist MMs, sclera anicteric


Neck: no JVD, supple, full ROM


Respiratory: no wheezing, no rales, no rhonchi, clear to auscultation bilateral


Cardiovascular: RRR, no significant murmur


Gastrointestinal: soft, non-tender, no distention


Musculoskeletal: no edema, pulses present


Neurological: non-focal, normal sensation, moves all 4 limbs


Psychiatric: normal affect, A&O x 3


Skin: no rash, normal turgor





Dx/Plan


(1) Renal failure (ARF), acute on chronic


Code(s): N17.9 - ACUTE KIDNEY FAILURE, UNSPECIFIED; N18.9 - CHRONIC KIDNEY 

DISEASE, UNSPECIFIED   Status: Acute   





(2) Chronic kidney disease (CKD)


Code(s): N18.9 - CHRONIC KIDNEY DISEASE, UNSPECIFIED   Status: Acute   





(3) Fluid overload


Code(s): E87.70 - FLUID OVERLOAD, UNSPECIFIED   Status: Acute   





(4) Hyperkalemia


Code(s): E87.5 - HYPERKALEMIA   Status: Acute   





(5) Hypertension


Code(s): I10 - ESSENTIAL (PRIMARY) HYPERTENSION   Status: Acute   





(6) Hypothyroidism


Code(s): E03.9 - HYPOTHYROIDISM, UNSPECIFIED   Status: Acute   





(7) Insulin dependent diabetes mellitus


Code(s): E11.9 - TYPE 2 DIABETES MELLITUS WITHOUT COMPLICATIONS; Z79.4 - LONG 

TERM (CURRENT) USE OF INSULIN   Status: Acute   





- Plan


Plan: 





(1) Acute renal failure on CKD


- s/p dialysis on 5/12 and 5/14


- dialysis this morning


- Fistula vs. graft in place in RUE


- Dr. Garnica following, appreciate recs


- Case management working to have patient set up with outside dialysis, pt is 

nonfunded.





(2) Cough with sputum production


- repeat cxr shows no lobar pneumonia, only stable chf findings


- WBC is normal this morning but trended up overnight to 10.2 


- afebrile.


- procalcitonin is 1.43, concerning for bacterial infection vs retention of 

metabolites from ckd. 


- He did have a temp of 100.5 last night, will consider treatment


- UA, CXR, C diff Ag and Toxin and Fecal leukocytes pending





(3) Fluid overload


- CXR shows pulmonary vessel prominence along with elevated BNP and symptoms


- s/p dialysis yesterday


- Strict I/Os


- Echo with EF 50-55% and elevated pulmonary artery pressure. 





(4) Elevated troponin


- Suspect demand ischemia, troponins downtrended


- No ST changes on EKG


- ASA 81mgx4 and nitro paste given in ED


- ASCVD risk 20.1%, started lipitor 40 mg daily





(5) Hyperkalemia, resolved


- s/p Lasix and kayexelate given in ED and dialysis


- continue to monitor





(6) Insulin dependent DM


- hgA1c is 6.0


- ACHS accuchecks


- Resume home insulin + moderate SSI





(7) HTN


- well controlled on current meds. 


- continue to monitor





(8) Hypothyroidism


- TSH WNL


- Continue home levothyroxine

## 2018-05-16 NOTE — ADD-PRG
DATE OF SERVICE:  05/16/2018

 

Mr. Terry is currently in dialysis undergoing this procedure without any issues.  Nephrology has patricia
d he can be discharged following dialysis.  Arrangements will be made for him to continue dialysis in
 [place].